# Patient Record
Sex: MALE | Race: WHITE | URBAN - NONMETROPOLITAN AREA
[De-identification: names, ages, dates, MRNs, and addresses within clinical notes are randomized per-mention and may not be internally consistent; named-entity substitution may affect disease eponyms.]

---

## 2019-02-04 ENCOUNTER — IMPORTED ENCOUNTER (OUTPATIENT)
Dept: URBAN - NONMETROPOLITAN AREA CLINIC 1 | Facility: CLINIC | Age: 49
End: 2019-02-04

## 2019-02-04 PROBLEM — H52.4: Noted: 2019-02-04

## 2019-02-04 PROBLEM — H40.013: Noted: 2019-02-04

## 2019-02-04 PROBLEM — H52.13: Noted: 2019-02-04

## 2019-02-04 PROCEDURE — 92310 CONTACT LENS FITTING OU: CPT

## 2019-02-04 PROCEDURE — S0621 ROUTINE OPHTHALMOLOGICAL EXA: HCPCS

## 2019-02-04 PROCEDURE — 92015 DETERMINE REFRACTIVE STATE: CPT

## 2019-02-04 NOTE — PATIENT DISCUSSION
Presbyopia-Discussed diagnosis with patient. Myopia-Discussed diagnosis with patient. -Explained that people who are myopic are at a higher risk for developing RD/RT and reviewed associated S&S.-Pt to contact our office if symptoms develop. Updated spec Rx given. Recommend lens that will provide comfort as well as protect safety and health of eyes. CL wear-CLs fit and center well.-Stressed that patient should not sleep in CL. -Updated CL Rx given. -CL care and precautions given. POAG Suspect OU- IOP OU adequately controlled without medication. Recommend continue to manage as a glaucoma suspect. - Recommend follow-up in 1 year with RNFL OCT and .

## 2020-02-24 ENCOUNTER — IMPORTED ENCOUNTER (OUTPATIENT)
Dept: URBAN - NONMETROPOLITAN AREA CLINIC 1 | Facility: CLINIC | Age: 50
End: 2020-02-24

## 2020-02-24 PROBLEM — H52.4: Noted: 2020-02-24

## 2020-02-24 PROBLEM — H40.013: Noted: 2020-02-24

## 2020-02-24 PROBLEM — H52.13: Noted: 2020-02-24

## 2020-02-24 PROCEDURE — 92015 DETERMINE REFRACTIVE STATE: CPT

## 2020-02-24 PROCEDURE — 92133 CPTRZD OPH DX IMG PST SGM ON: CPT

## 2020-02-24 PROCEDURE — 99214 OFFICE O/P EST MOD 30 MIN: CPT

## 2020-02-24 PROCEDURE — 92083 EXTENDED VISUAL FIELD XM: CPT

## 2020-02-24 NOTE — PATIENT DISCUSSION
POAG Suspect OU- Recommend  today and shows:OU: Significant fixation losses but WNL. - Recommend OCT-RNFL today and shows:OD: Vert CDR 0.55; thin neural rim superiorly; marginal thinning of RNFL inferiorly. OS: Vert CDR 0.55; thin neural rim superiorly and inferiorly; marginal thinning of RNFL temporally. - IOP OU adequately controlled without medication but due to high myopia OU and OCT data recommend second opinion with Dr. Gil Sagastume to determine if patient need medical management. Presbyopia-Discussed diagnosis with patient. High Myopia-Discussed diagnosis with patient. -Explained that people who are myopic are at a higher risk for developing RD/RT and reviewed associated S&S.-Pt to contact our office if symptoms develop. Updated spec Rx given. Recommend lens that will provide comfort as well as protect safety and health of eyes.

## 2022-04-16 ASSESSMENT — VISUAL ACUITY
OS_SC: J2
OD_SC: 20/20-2
OD_SC: 20/50+
OS_SC: 20/25
OS_SC: 20/25
OD_SC: 20/50+
OD_PH: 20/20
OD_CC: J2
OS_SC: 20/20-2
OD_SC: J2
OS_CC: J2
OD_SC: J2
OS_SC: J2

## 2022-04-16 ASSESSMENT — TONOMETRY
OD_IOP_MMHG: 18
OD_IOP_MMHG: 18
OS_IOP_MMHG: 19
OS_IOP_MMHG: 18